# Patient Record
Sex: MALE | Race: NATIVE HAWAIIAN OR OTHER PACIFIC ISLANDER | Employment: FULL TIME | URBAN - METROPOLITAN AREA
[De-identification: names, ages, dates, MRNs, and addresses within clinical notes are randomized per-mention and may not be internally consistent; named-entity substitution may affect disease eponyms.]

---

## 2018-08-15 ENCOUNTER — HOSPITAL ENCOUNTER (OUTPATIENT)
Dept: PHYSICAL THERAPY | Age: 38
Discharge: HOME | End: 2018-08-15
Payer: COMMERCIAL

## 2018-08-15 PROCEDURE — 97162 PT EVAL MOD COMPLEX 30 MIN: CPT

## 2018-08-15 PROCEDURE — 97530 THERAPEUTIC ACTIVITIES: CPT
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
## 2018-08-17 ENCOUNTER — HOSPITAL ENCOUNTER (OUTPATIENT)
Dept: PHYSICAL THERAPY | Age: 38
Discharge: HOME | End: 2018-08-17
Payer: COMMERCIAL

## 2018-08-17 PROCEDURE — 97110 THERAPEUTIC EXERCISES: CPT

## 2018-08-17 NOTE — PROGRESS NOTES
PT DAILY TREATMENT NOTE     Patient Name: Oscar Diamond  Date:2018  : 1980  [x]  Patient  Verified  Payor: David Alexander / Plan: Cindy Ca PPO / Product Type: PPO /    In time:11:30  Out time:12:20  Total Treatment Time (min): 50  Visit #: 2 of 12    Treatment Area: Low back pain [M54.5]    SUBJECTIVE  Pain Level (0-10 scale): 0  Any medication changes, allergies to medications, adverse drug reactions, diagnosis change, or new procedure performed?: [x] No    [] Yes (see summary sheet for update)  Subjective functional status/changes:   [] No changes reported  \"IT feels fine. Just stiff. \"     OBJECTIVE    50 min Therapeutic Exercise:  [x] See flow sheet :   Rationale: increase ROM, increase strength, improve coordination and improve balance to improve the patients ability to perform pain free and safe ADL's and return to work activity           With   [] TE   [] TA   [] neuro   [] other: Patient Education: [x] Review HEP    [] Progressed/Changed HEP based on:   [] positioning   [] body mechanics   [] transfers   [] heat/ice application    [] other:      Other Objective/Functional Measures: Noted thoracis extension      Pain Level (0-10 scale) post treatment: 1    ASSESSMENT/Changes in Function: Pt was able to perform all exercises well with no increased pain or sx's. Noted B rib flare and rib cage protraction when lying supine. Focused on HS and breath with 90/90. Reported slight increase in discomfort in all four belly lift at surgical site. Noted very weak and inhibited serratus. Patient will continue to benefit from skilled PT services to modify and progress therapeutic interventions, address functional mobility deficits, address ROM deficits, address strength deficits, analyze and address soft tissue restrictions, analyze and cue movement patterns, analyze and modify body mechanics/ergonomics and assess and modify postural abnormalities to attain remaining goals.      []  See Plan of Care  [] See progress note/recertification  []  See Discharge Summary         Progress towards goals / Updated goals:  Short Term Goals: STG- To be accomplished in 2 week(s):  1. Pt will be independent with HEP to encourage prophylaxis. Eval:dispensed  Current: Pt reported compliance       Long Term Goals: LTG- To be accomplished in 6 week(s):  1. Pt will increase trunk rotation to less than 15 in bilaterally to improve mobility . Eval:RIght 17.5 in                 Left: 18 in   Current: NA     2.  Pt will be able to bridge >10 times to full height with no pain to indicate functional glut hamstring strength needed for daily activities. Eval:pain with 1 bridge  Current: NA     3.  Pt will be able to bend and squat and lift 3 y.o. Child without pain. Eval:pain and fear  Current: NA     4. Pt FOTO score will increase by 15 points to show improvement in function.   Eval:53  Current: will address at visit 5    PLAN  [x]  Upgrade activities as tolerated     [x]  Continue plan of care  []  Update interventions per flow sheet       []  Discharge due to:_  []  Other:_      Francesca Hazel PTA 8/17/2018  12:30 PM    Future Appointments  Date Time Provider Abdelrahman Ding   8/21/2018 6:00 PM Blessing Dewitt [facility]   8/23/2018 4:30 PM Dillon Ryder PT, DPT [facility]   8/28/2018 6:00 PM VIRGINIA Murphy[facility]JUAN [facility]   8/30/2018 5:00 PM Dillon Ryder PT, DPT [facility]

## 2018-08-21 ENCOUNTER — HOSPITAL ENCOUNTER (OUTPATIENT)
Dept: PHYSICAL THERAPY | Age: 38
Discharge: HOME | End: 2018-08-21
Payer: COMMERCIAL

## 2018-08-21 PROCEDURE — 97110 THERAPEUTIC EXERCISES: CPT

## 2018-08-21 NOTE — PROGRESS NOTES
PT DAILY TREATMENT NOTE     Patient Name: Dutch Rizo  Date:2018  : 1980  [x]  Patient  Verified  Payor: Mahad Alert / Plan: Priscilla Harris PPO / Product Type: PPO /    In time:6:00  Out time:6:50  Total Treatment Time (min): 50  Visit #: 3 of 12    Treatment Area: Low back pain [M54.5]    SUBJECTIVE  Pain Level (0-10 scale): 3-4/10  Any medication changes, allergies to medications, adverse drug reactions, diagnosis change, or new procedure performed?: [x] No    [] Yes (see summary sheet for update)  Subjective functional status/changes:   [] No changes reported  \"I was sitting at work. ITs worse at the end of the day. \"     OBJECTIVE  50 min Therapeutic Exercise:  [x] See flow sheet :   Rationale: increase ROM, increase strength, improve coordination and improve balance to improve the patients ability to perofrm pain free ADL's     With   [] TE   [] TA   [] neuro   [] other: Patient Education: [x] Review HEP    [] Progressed/Changed HEP based on:   [] positioning   [] body mechanics   [] transfers   [] heat/ice application    [] other:      Other Objective/Functional Measures:   TTP at Lumbar paraspinals right > left      Pain Level (0-10 scale) post treatment: 0    ASSESSMENT/Changes in Function: Pt reported soreness after sitting at work all day. Good response to SB stretching. Noted very tone Lumbar paraspinals. Continues to need to address post chain inhibition , and increase core strength. Plan to add cross connect for obliques NV. Patient will continue to benefit from skilled PT services to modify and progress therapeutic interventions, address functional mobility deficits, address ROM deficits, address strength deficits, analyze and address soft tissue restrictions, analyze and cue movement patterns, analyze and modify body mechanics/ergonomics, assess and modify postural abnormalities, address imbalance/dizziness and instruct in home and community integration to attain remaining goals. []  See Plan of Care  []  See progress note/recertification  []  See Discharge Summary         Progress towards goals / Updated goals:  Short Term Goals: STG- To be accomplished in 2 week(s):  1.  Pt will be independent with HEP to encourage prophylaxis. Eval:dispensed  Current: Pt reported compliance       Long Term Goals: LTG- To be accomplished in 6 week(s):  1.  Pt will increase trunk rotation to less than 15 in bilaterally to improve mobility . Eval:RIght 17.5 in                 Left: 18 in   Current: NA      2.  Pt will be able to bridge >10 times to full height with no pain to indicate functional glut hamstring strength needed for daily activities. Eval:pain with 1 bridge  Current: NA      3.  Pt will be able to bend and squat and lift 3 y.o. Child without pain. Eval:pain and fear  Current: NA      4.  Pt FOTO score will increase by 15 points to show improvement in function.   Eval:53  Current: will address at visit 5       PLAN  [x]  Upgrade activities as tolerated     [x]  Continue plan of care  []  Update interventions per flow sheet       []  Discharge due to:_  []  Other:_      Jordana Funez PTA 2018  6:34 PM    Future Appointments  Date Time Provider Abdelrahman Ding   2018 4:30 PM Roma Castillo PT, DPT [facility]   2018 6:00 PM VIRGINIA Shaikh [facility]   2018 5:00 PM Roma Castillo PT, DPT ROXANN [facility]

## 2018-08-23 ENCOUNTER — HOSPITAL ENCOUNTER (OUTPATIENT)
Dept: PHYSICAL THERAPY | Age: 38
Discharge: HOME | End: 2018-08-23
Payer: COMMERCIAL

## 2018-08-23 PROCEDURE — 97530 THERAPEUTIC ACTIVITIES: CPT

## 2018-08-23 PROCEDURE — 97110 THERAPEUTIC EXERCISES: CPT

## 2018-08-23 NOTE — PROGRESS NOTES
PT DAILY TREATMENT NOTE 12    Patient Name: Jannette Avila  Date:2018  : 1980  [x]  Patient  Verified  Payor: Emily Crouch / Plan: Avinash Tucker PPO / Product Type: PPO /    In time:4:35 pm  Out time:5:35 pm   Total Treatment Time (min): 60  Visit #: 4 of 12    Treatment Area: Low back pain [M54.5]    SUBJECTIVE  Pain Level (0-10 scale): 4  Any medication changes, allergies to medications, adverse drug reactions, diagnosis change, or new procedure performed?: [x] No    [] Yes (see summary sheet for update)  Subjective functional status/changes:   [] No changes reported  \"Just a little sore from sitting. \"    OBJECTIVE    Modality rationale:    Min Type Additional Details    [] Estim:  []Unatt       []IFC  []Premod                        []Other:  []w/ice   []w/heat  Position:  Location:    [] Estim: []Att    []TENS instruct  []NMES                    []Other:  []w/US   []w/ice   []w/heat  Position:  Location:    []  Traction: [] Cervical       []Lumbar                       [] Prone          []Supine                       []Intermittent   []Continuous Lbs:  [] before manual  [] after manual    []  Ultrasound: []Continuous   [] Pulsed                           []1MHz   []3MHz W/cm2:  Location:    []  Iontophoresis with dexamethasone         Location: [] Take home patch   [] In clinic    []  Ice     []  heat  []  Ice massage  []  Laser   []  Anodyne Position:  Location:    []  Laser with stim  []  Other:  Position:  Location:    []  Vasopneumatic Device Pressure:       [] lo [] med [] hi   Temperature: [] lo [] med [] hi   [] Skin assessment post-treatment:  []intact []redness- no adverse reaction    []redness  adverse reaction:     50 min Therapeutic Exercise:  [x] See flow sheet :   Rationale: increase ROM, increase strength, improve coordination and increase proprioception to improve the patients ability to perform daily activities with decreased pain and symptom levels    10 min Therapeutic Activity: [x]  See flow sheet :   Rationale: increase ROM, increase strength, improve coordination and increase proprioception  to improve the patients ability to perform daily activities with decreased pain and symptom levels           With   [] TE   [x] TA   [] neuro   [] other: Patient Education: [x] Review HEP    [] Progressed/Changed HEP based on:   [] positioning   [] body mechanics   [] transfers   [] heat/ice application    [] other:      Other Objective/Functional Measures:   Trunk rot 17 in bilaterally     Pain Level (0-10 scale) post treatment: 0-1    ASSESSMENT/Changes in Function:   Pt is tolerating treatment well. Most pain with prolonged sitting. Initiated crossovers this session. Patient will continue to benefit from skilled PT services to modify and progress therapeutic interventions, address functional mobility deficits, address ROM deficits, address strength deficits, analyze and address soft tissue restrictions, analyze and cue movement patterns, analyze and modify body mechanics/ergonomics, assess and modify postural abnormalities and instruct in home and community integration to attain remaining goals. []  See Plan of Care  []  See progress note/recertification  []  See Discharge Summary         Progress towards goals / Updated goals:  Short Term Goals: STG- To be accomplished in 2 week(s):  1.  Pt will be independent with HEP to encourage prophylaxis. Eval:dispensed  Current: Pt reported compliance       Long Term Goals: LTG- To be accomplished in 6 week(s):  1.  Pt will increase trunk rotation to less than 15 in bilaterally to improve mobility . Eval:RIght 17.5 in                 Left: 18 in   Current: progressing 17 in       2.  Pt will be able to bridge >10 times to full height with no pain to indicate functional glut hamstring strength needed for daily activities. Eval:pain with 1 bridge  Current: NA      3.  Pt will be able to bend and squat and lift 3 y.o.  Child without pain.  Eval:pain and fear  Current: NA      4.  Pt FOTO score will increase by 15 points to show improvement in function.   Eval:53  Current: will address at visit 5    PLAN  [x]  Upgrade activities as tolerated     []  Continue plan of care  []  Update interventions per flow sheet       []  Discharge due to:_  []  Other:_      Jose Guadalupe Ayala PT, DPT 8/23/2018  4:38 PM    Future Appointments  Date Time Provider Abdlerahman Ding   8/28/2018 6:00 PM Carmina Delgadillo PTA [facility]   8/30/2018 5:00 PM Jose Guadalupe Ayala PT, DPT [facility]   9/19/2018 3:30 PM Alyse Lubin MD [id number] [facility]

## 2018-08-28 ENCOUNTER — HOSPITAL ENCOUNTER (OUTPATIENT)
Dept: PHYSICAL THERAPY | Age: 38
Discharge: HOME | End: 2018-08-28
Payer: COMMERCIAL

## 2018-08-28 PROCEDURE — 97110 THERAPEUTIC EXERCISES: CPT

## 2018-08-28 PROCEDURE — 97530 THERAPEUTIC ACTIVITIES: CPT

## 2018-08-28 NOTE — PROGRESS NOTES
PT DAILY TREATMENT NOTE  Patient Name: Jag Flynn 
Date:2018 : 1980 [x]  Patient  Verified Payor: Radha Moralez / Plan: Laura Blanco PPO / Product Type: PPO / In time:5:55 pm  Out time:6:55 pm  
Total Treatment Time (min): 60 Visit #: 5 of 12 Treatment Area: Low back pain [M54.5] SUBJECTIVE Pain Level (0-10 scale): 3 Any medication changes, allergies to medications, adverse drug reactions, diagnosis change, or new procedure performed?: [x] No    [] Yes (see summary sheet for update) Subjective functional status/changes:   [] No changes reported \"I am noticing a little pain. \" OBJECTIVE Modality rationale:   
Min Type Additional Details  
 [] Estim:  []Unatt       []IFC  []Premod []Other:  []w/ice   []w/heat Position: Location:  
 [] Estim: []Att    []TENS instruct  []NMES []Other:  []w/US   []w/ice   []w/heat Position: Location:  
 []  Traction: [] Cervical       []Lumbar 
                     [] Prone          []Supine []Intermittent   []Continuous Lbs: 
[] before manual 
[] after manual  
 []  Ultrasound: []Continuous   [] Pulsed []1MHz   []3MHz W/cm2: 
Location:  
 []  Iontophoresis with dexamethasone Location: [] Take home patch  
[] In clinic  
 []  Ice     []  heat 
[]  Ice massage 
[]  Laser  
[]  Anodyne Position: Location:  
 []  Laser with stim 
[]  Other:  Position: Location:  
 []  Vasopneumatic Device Pressure:       [] lo [] med [] hi  
Temperature: [] lo [] med [] hi  
[] Skin assessment post-treatment:  []intact []redness- no adverse reaction 
  []redness  adverse reaction:  
 
50 min Therapeutic Exercise:  [x] See flow sheet :  
Rationale: increase ROM, increase strength, improve coordination and increase proprioception to improve the patients ability to perform daily activities with decreased pain and symptom levels 10 min Therapeutic Activity:  [x]  See flow sheet :  
Rationale: increase ROM, increase strength, improve coordination and increase proprioception  to improve the patients ability to perform daily activities with decreased pain and symptom levels With 
 [] TE 
 [] TA 
 [] neuro 
 [] other: Patient Education: [x] Review HEP [] Progressed/Changed HEP based on:  
[] positioning   [] body mechanics   [] transfers   [] heat/ice application   
[] other:   
 
Other Objective/Functional Measures: FOTO: 63  
 
Pain Level (0-10 scale) post treatment: 0-1 ASSESSMENT/Changes in Function:  
Pt responded well to trunk rotation. Increased use of accessory muscles for respiration. Patient will continue to benefit from skilled PT services to modify and progress therapeutic interventions, address functional mobility deficits, address ROM deficits, address strength deficits, analyze and address soft tissue restrictions, analyze and cue movement patterns, analyze and modify body mechanics/ergonomics, assess and modify postural abnormalities and instruct in home and community integration to attain remaining goals. []  See Plan of Care 
[]  See progress note/recertification 
[]  See Discharge Summary Progress towards goals / Updated goals: 
Short Term Goals: STG- To be accomplished in 2 week(s): 1.  Pt will be independent with HEP to encourage prophylaxis. Eval:dispensed Current: Pt reported compliance  
   
Long Term Goals: LTG- To be accomplished in 6 week(s): 1.  Pt will increase trunk rotation to less than 15 in bilaterally to improve mobility . Eval:RIght 17.5 in                 Left: 18 in  
Current: progressing 17 in  
   
2.  Pt will be able to bridge >10 times to full height with no pain to indicate functional glut hamstring strength needed for daily activities. Eval:pain with 1 bridge Current: NA 
   
3.  Pt will be able to bend and squat and lift 5 y.o. Child without pain. Eval:pain and fear Current: NA 
   
4.  Pt FOTO score will increase by 15 points to show improvement in function. Eval:53 Current: progressing 63 PLAN [x]  Upgrade activities as tolerated     []  Continue plan of care 
[]  Update interventions per flow sheet      
[]  Discharge due to:_ 
[]  Other:_ David Bauer PT, DPT 8/28/2018  6:06 PM 
 
Future Appointments Date Time Provider Abdelrahman Toañ 8/30/2018 5:00 PM David Bauer PT, DPT [facility]  
9/4/2018 6:00 PM Marbella Meals, PTA [facility]  
9/7/2018 8:30 AM Marbella Meals, PTA [facility]  
9/11/2018 5:30 PM David Bauer PT, DPT [facility]  
9/13/2018 6:00 PM Marbella Meals, PTA [facility]  
9/18/2018 5:30 PM Marbella Meals, PTA [facility]  
9/19/2018 3:30 PM Jazmyne Singleton MD [facility]  
9/20/2018 5:30 PM Marbella Meals, PTA [facility]  
9/25/2018 6:00 PM David Bauer PT, DPT [facility]  
9/27/2018 6:00 PM Marbella Meals, PTA [facility]

## 2018-08-30 ENCOUNTER — HOSPITAL ENCOUNTER (OUTPATIENT)
Dept: PHYSICAL THERAPY | Age: 38
Discharge: HOME | End: 2018-08-30
Payer: COMMERCIAL

## 2018-08-30 PROCEDURE — 97110 THERAPEUTIC EXERCISES: CPT

## 2018-08-30 PROCEDURE — 97530 THERAPEUTIC ACTIVITIES: CPT

## 2018-08-30 NOTE — PROGRESS NOTES
PT DAILY TREATMENT NOTE 12 Patient Name: Jenni Arana 
Date:2018 : 1980 [x]  Patient  Verified Payor: Mary Pruitt / Plan: Rodriguez Spray PPO / Product Type: PPO / In time: 4:28 pm  Out time:5:23 pm  
Total Treatment Time (min): 55 Visit #: 6 of 12 Treatment Area: Low back pain [M54.5] SUBJECTIVE Pain Level (0-10 scale): 0 Any medication changes, allergies to medications, adverse drug reactions, diagnosis change, or new procedure performed?: [x] No    [] Yes (see summary sheet for update) Subjective functional status/changes:   [] No changes reported \"Pretty good. \" OBJECTIVE Modality rationale:   
Min Type Additional Details  
 [] Estim:  []Unatt       []IFC  []Premod []Other:  []w/ice   []w/heat Position: Location:  
 [] Estim: []Att    []TENS instruct  []NMES []Other:  []w/US   []w/ice   []w/heat Position: Location:  
 []  Traction: [] Cervical       []Lumbar 
                     [] Prone          []Supine []Intermittent   []Continuous Lbs: 
[] before manual 
[] after manual  
 []  Ultrasound: []Continuous   [] Pulsed []1MHz   []3MHz W/cm2: 
Location:  
 []  Iontophoresis with dexamethasone Location: [] Take home patch  
[] In clinic  
 []  Ice     []  heat 
[]  Ice massage 
[]  Laser  
[]  Anodyne Position: Location:  
 []  Laser with stim 
[]  Other:  Position: Location:  
 []  Vasopneumatic Device Pressure:       [] lo [] med [] hi  
Temperature: [] lo [] med [] hi  
[] Skin assessment post-treatment:  []intact []redness- no adverse reaction 
  []redness  adverse reaction:  
 
45 min Therapeutic Exercise:  [x] See flow sheet :  
Rationale: increase ROM, increase strength, improve coordination and increase proprioception to improve the patients ability to perform daily activities with decreased pain and symptom levels 
  
 10 min Therapeutic Activity:  [x]  See flow sheet :  
Rationale: increase ROM, increase strength, improve coordination and increase proprioception  to improve the patients ability to perform daily activities with decreased pain and symptom levels With 
 [] TE 
 [] TA 
 [] neuro 
 [] other: Patient Education: [x] Review HEP [] Progressed/Changed HEP based on:  
[] positioning   [] body mechanics   [] transfers   [] heat/ice application   
[] other:   
 
Other Objective/Functional Measures:  
Poor squat form Decreased height with bridges Pain Level (0-10 scale) post treatment: 0 
 
ASSESSMENT/Changes in Function:  
Pt is demonstrating gradual progress with treatment. No pain this session and was able to initiate bridging. significant abdominal weakness and fatigue. Patient will continue to benefit from skilled PT services to modify and progress therapeutic interventions, address functional mobility deficits, address ROM deficits, address strength deficits, analyze and address soft tissue restrictions, analyze and cue movement patterns, analyze and modify body mechanics/ergonomics, assess and modify postural abnormalities and instruct in home and community integration to attain remaining goals. []  See Plan of Care 
[]  See progress note/recertification 
[]  See Discharge Summary Progress towards goals / Updated goals: 
Short Term Goals: STG- To be accomplished in 2 week(s): 1.  Pt will be independent with HEP to encourage prophylaxis. Eval:dispensed Current: Pt reported compliance  
   
Long Term Goals: LTG- To be accomplished in 6 week(s): 1.  Pt will increase trunk rotation to less than 15 in bilaterally to improve mobility . Eval:RIght 17.5 in                 Left: 18 in  
Current: progressing 17 in  
   
2.  Pt will be able to bridge >10 times to full height with no pain to indicate functional glut hamstring strength needed for daily activities. Eval:pain with 1 bridge Current: tolerated 10 bridges with decreased height, no pain - PROGRESSING 
   
3.  Pt will be able to bend and squat and lift 5 y.o. Child without pain. Eval:pain and fear Current: NA 
   
4.  Pt FOTO score will increase by 15 points to show improvement in function. Eval:53 Current: progressing 63 PLAN [x]  Upgrade activities as tolerated     []  Continue plan of care 
[]  Update interventions per flow sheet      
[]  Discharge due to:_ 
[]  Other:_ Thao Warren PT, DPT 8/30/2018  4:26 PM 
 
Future Appointments Date Time Provider Abdelrahman Ding 8/30/2018 4:30 PM Thao Warren PT, DPT [facility]  
9/4/2018 6:00 PM Leo Anderson PTA [facility]  
9/7/2018 8:30 AM Leo Anderson PTA [facility]  
9/11/2018 5:30 PM Thao Warren PT, DPT [facility]  
9/13/2018 6:00 PM Leo Peaches, PTA [facility]  
9/18/2018 5:30 PM Leo Anderson, PTA [facility]  
9/19/2018 3:30 PM Osiel Greer MD [address]  
9/20/2018 5:30 PM Leo Anderson, PTA [facility]  
9/25/2018 6:00 PM Thao Warren PT, DPT [facility]  
9/27/2018 6:00 PM Leo Anderson PTA [facility]

## 2018-09-04 ENCOUNTER — APPOINTMENT (OUTPATIENT)
Dept: PHYSICAL THERAPY | Age: 38
End: 2018-09-04
Payer: COMMERCIAL

## 2018-09-05 ENCOUNTER — APPOINTMENT (OUTPATIENT)
Dept: PHYSICAL THERAPY | Age: 38
End: 2018-09-05
Payer: COMMERCIAL

## 2018-09-05 ENCOUNTER — HOSPITAL ENCOUNTER (OUTPATIENT)
Dept: PHYSICAL THERAPY | Age: 38
Discharge: HOME | End: 2018-09-05
Payer: COMMERCIAL

## 2018-09-05 PROCEDURE — 97110 THERAPEUTIC EXERCISES: CPT

## 2018-09-05 PROCEDURE — 97530 THERAPEUTIC ACTIVITIES: CPT

## 2018-09-05 NOTE — PROGRESS NOTES
PT DAILY TREATMENT NOTE  Patient Name: Maira Cruz 
Date:2018 : 1980 [x]  Patient  Verified Payor: Marquita Bustamante / Plan: Arlander Boeck PPO / Product Type: PPO / In time:8:30  Out time:9:35 Total Treatment Time (min): 65 Visit #: 7 of 12 Treatment Area: Low back pain [M54.5] SUBJECTIVE Pain Level (0-10 scale): 0 Any medication changes, allergies to medications, adverse drug reactions, diagnosis change, or new procedure performed?: [x] No    [] Yes (see summary sheet for update) Subjective functional status/changes:   [] No changes reported \"Its getting better\" OBJECTIVE 30 min Therapeutic Exercise:  [x] See flow sheet :  
Rationale: increase ROM, increase strength, improve coordination and improve balance to improve the patients ability to perform pain free ADL's  
 
35 min Therapeutic Activity:  []  See flow sheet :  
Rationale: increase ROM, increase strength, improve coordination and improve balance  to improve the patients ability to perform pain free ADl's With 
 [] TE 
 [] TA 
 [] neuro 
 [] other: Patient Education: [x] Review HEP [] Progressed/Changed HEP based on:  
[] positioning   [] body mechanics   [] transfers   [] heat/ice application   
[] other:   
 
Other Objective/Functional Measures:  
Improved thoracic flexion Pain Level (0-10 scale) post treatment: 0 
 
ASSESSMENT/Changes in Function: Pt continues to demonstrate improved tolerance to activity and thoracic flexion. Added supine thoracic flexion with bar to mid shin, noted muscle shaking and difficulty. Continued to note weakness and fatigue in abs.   
 
Patient will continue to benefit from skilled PT services to modify and progress therapeutic interventions, address functional mobility deficits, address ROM deficits, address strength deficits, analyze and address soft tissue restrictions, analyze and cue movement patterns, analyze and modify body mechanics/ergonomics, assess and modify postural abnormalities, address imbalance/dizziness and instruct in home and community integration to attain remaining goals. []  See Plan of Care 
[]  See progress note/recertification 
[]  See Discharge Summary Progress towards goals / Updated goals: 
Short Term Goals: STG- To be accomplished in 2 week(s): 1.  Pt will be independent with HEP to encourage prophylaxis. Eval:dispensed Current: Pt reported compliance  
   
Long Term Goals: LTG- To be accomplished in 6 week(s): 1.  Pt will increase trunk rotation to less than 15 in bilaterally to improve mobility . Eval:RIght 17.5 in                 Left: 18 in  
Current: progressing 17 in  
   
2.  Pt will be able to bridge >10 times to full height with no pain to indicate functional glut hamstring strength needed for daily activities. Eval:pain with 1 bridge Current: tolerated 10 bridges with decreased height, no pain - PROGRESSING 
   
3.  Pt will be able to bend and squat and lift 5 y.o. Child without pain. Eval:pain and fear Current: NA 
   
4.  Pt FOTO score will increase by 15 points to show improvement in function. Eval:53 Current: progressing 63 
  
 
PLAN [x]  Upgrade activities as tolerated     [x]  Continue plan of care 
[]  Update interventions per flow sheet      
[]  Discharge due to:_ 
[]  Other:_ Michi Long PTA 9/5/2018  10:14 AM 
 
Future Appointments Date Time Provider Abdelrahman Ding 9/7/2018 8:30 AM VIRGINIA Samuel [facility]  
9/10/2018 5:00 PM VIRGINIA SamuelHPJUAN [facility]  
9/13/2018 6:00 PM VIRGINIA SamuelHPJUAN [facility]  
9/18/2018 5:30 PM Michi Long PTA [facility]  
9/19/2018 3:30 PM [facility]  
9/20/2018 5:30 PM V[place] SamuelHPJUAN [facility]  
9/25/2018 6:00 PM Lorie Luo PT, DPT [facility]  
9/27/2018 6:00 PM VIRGINIA SamuelHPJUAN [facility]

## 2018-09-06 ENCOUNTER — HOSPITAL ENCOUNTER (OUTPATIENT)
Dept: PHYSICAL THERAPY | Age: 38
Discharge: HOME | End: 2018-09-06
Payer: COMMERCIAL

## 2018-09-06 PROCEDURE — 97530 THERAPEUTIC ACTIVITIES: CPT

## 2018-09-06 PROCEDURE — 97110 THERAPEUTIC EXERCISES: CPT
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
## 2018-09-07 ENCOUNTER — APPOINTMENT (OUTPATIENT)
Dept: PHYSICAL THERAPY | Age: 38
End: 2018-09-07
Payer: COMMERCIAL

## 2018-09-10 ENCOUNTER — HOSPITAL ENCOUNTER (OUTPATIENT)
Dept: PHYSICAL THERAPY | Age: 38
Discharge: HOME | End: 2018-09-10
Payer: COMMERCIAL

## 2018-09-10 PROCEDURE — 97110 THERAPEUTIC EXERCISES: CPT

## 2018-09-10 NOTE — PROGRESS NOTES
PT DAILY TREATMENT NOTE  Patient Name: Edil Corey 
Date:9/10/2018 : 1980 [x]  Patient  Verified Payor: Taz Rice / Plan: Frank Ramon PPO / Product Type: PPO / In time:5:00  Out time:5:45 Total Treatment Time (min): 45 Visit #: 9 of 12 Treatment Area: Low back pain [M54.5] SUBJECTIVE Pain Level (0-10 scale): 0 Any medication changes, allergies to medications, adverse drug reactions, diagnosis change, or new procedure performed?: [x] No    [] Yes (see summary sheet for update) Subjective functional status/changes:   [] No changes reported 'I drove my daughter to school and it was a 7 hour drive, so its sore. \" OBJECTIVE 45 min Therapeutic Exercise:  [x] See flow sheet :  
Rationale: increase ROM, increase strength and improve coordination to improve the patients ability to perform pain free ADL's With 
 [] TE 
 [] TA 
 [] neuro 
 [] other: Patient Education: [x] Review HEP [] Progressed/Changed HEP based on:  
[] positioning   [] body mechanics   [] transfers   [] heat/ice application   
[] other:   
 
Other Objective/Functional Measures: LTR Left 17 right 19 Pain Level (0-10 scale) post treatment: 0 
 
ASSESSMENT/Changes in Function: Pt reported pain increased when sitting and driving for 7 hours. Noted good thoracic flexion with supine bar reach, and notable fasciculations of obliques. Patient will continue to benefit from skilled PT services to modify and progress therapeutic interventions, address functional mobility deficits, address ROM deficits, address strength deficits, analyze and address soft tissue restrictions, analyze and cue movement patterns, analyze and modify body mechanics/ergonomics, assess and modify postural abnormalities, address imbalance/dizziness and instruct in home and community integration to attain remaining goals. []  See Plan of Care 
[]  See progress note/recertification 
[]  See Discharge Summary Progress towards goals / Updated goals: 
Short Term Goals: STG- To be accomplished in 2 week(s): 1.  Pt will be independent with HEP to encourage prophylaxis. Eval:dispensed Current: Pt reported compliance - goal MET 
   
Long Term Goals: LTG- To be accomplished in 6 week(s): 1.  Pt will increase trunk rotation to less than 15 in bilaterally to improve mobility . Eval:RIght 17.5 in                 Left: 18 in  
Current: 17 in - progressing  
   
2.  Pt will be able to bridge >10 times to full height with no pain to indicate functional glut hamstring strength needed for daily activities. Eval:pain with 1 bridge Current: tolerated 20 bridges with decreased height, no pain - PROGRESSING 
   
3.  Pt will be able to bend and squat and lift 5 y.o. Child without pain. Eval:pain and fear Current: improving, able to  son however achy still with max pain 2/10 - progressing  
   
4.  Pt FOTO score will increase by 15 points to show improvement in function. Eval:53 Current: 61 - progressing  
   
  
 
PLAN [x]  Upgrade activities as tolerated     [x]  Continue plan of care 
[]  Update interventions per flow sheet      
[]  Discharge due to:_ 
[]  Other:_ Zuly Garcia PTA 9/10/2018  5:48 PM 
 
Future Appointments Date Time Provider Abdelrahman Ding 9/13/2018 6:00 PM [facility] [facility]  
9/18/2018 5:30 PM V[place] Oreilly[facility]JU[facility]  
9/19/2018 3:30 PM Jose L Duggan MD [id number] [facility]  
9/20/2018 5:30 PM [facility]  
9/25/2018 6:00 PM Dylan Caballero PT, DPT [facility]  
9/27/2018 6:00 PM V[place] Oreilly[facility]JU[facility]

## 2018-09-11 ENCOUNTER — APPOINTMENT (OUTPATIENT)
Dept: PHYSICAL THERAPY | Age: 38
End: 2018-09-11
Payer: COMMERCIAL

## 2018-09-13 ENCOUNTER — APPOINTMENT (OUTPATIENT)
Dept: PHYSICAL THERAPY | Age: 38
End: 2018-09-13
Payer: COMMERCIAL

## 2018-09-18 ENCOUNTER — HOSPITAL ENCOUNTER (OUTPATIENT)
Dept: PHYSICAL THERAPY | Age: 38
Discharge: HOME | End: 2018-09-18
Payer: COMMERCIAL

## 2018-09-18 PROBLEM — R31.29 MICROSCOPIC HEMATURIA: Status: ACTIVE | Noted: 2018-09-18

## 2018-09-18 PROBLEM — R97.20 ELEVATED PSA: Status: ACTIVE | Noted: 2018-09-18

## 2018-09-18 PROCEDURE — 97110 THERAPEUTIC EXERCISES: CPT

## 2018-09-18 NOTE — PROGRESS NOTES
PT DAILY TREATMENT NOTE  Patient Name: Charly Field 
Date:2018 : 1980 [x]  Patient  Verified Payor: Ok Leary / Plan: Mode Sheriff PPO / Product Type: PPO / In time:5:30  Out time:6:20 Total Treatment Time (min): 50 Visit #: 10 of 12 Treatment Area: Low back pain [M54.5] SUBJECTIVE Pain Level (0-10 scale): 0 Any medication changes, allergies to medications, adverse drug reactions, diagnosis change, or new procedure performed?: [x] No    [] Yes (see summary sheet for update) Subjective functional status/changes:   [] No changes reported \"no pain. \" OBJECTIVE Modality rationale: decrease edema, decrease inflammation and decrease pain to improve the patients ability to perform pain free ADL's   
Min Type Additional Details  
 [] Estim:  []Unatt       []IFC  []Premod []Other:  []w/ice   []w/heat Position: Location:  
 [] Estim: []Att    []TENS instruct  []NMES []Other:  []w/US   []w/ice   []w/heat Position: Location:  
 []  Traction: [] Cervical       []Lumbar 
                     [] Prone          []Supine []Intermittent   []Continuous Lbs: 
[] before manual 
[] after manual  
 []  Ultrasound: []Continuous   [] Pulsed []1MHz   []3MHz W/cm2: 
Location:  
 []  Iontophoresis with dexamethasone Location: [] Take home patch  
[] In clinic  
 []  Ice     []  heat 
[]  Ice massage 
[]  Laser  
[]  Anodyne Position: Location:  
 []  Laser with stim 
[]  Other:  Position: Location:  
 []  Vasopneumatic Device Pressure:       [] lo [] med [] hi  
Temperature: [] lo [] med [] hi  
[] Skin assessment post-treatment:  []intact []redness- no adverse reaction 50 min Therapeutic Exercise:  [] See flow sheet :  
Rationale: increase ROM, increase strength and improve coordination to improve the patients ability to perform With 
 [] TE 
 [] TA 
 [] neuro [] other: Patient Education: [x] Review HEP [] Progressed/Changed HEP based on:  
[] positioning   [] body mechanics   [] transfers   [] heat/ice application   
[] other:   
 
Other Objective/Functional Measures:  
Supine bar reach to ankles 1-2 inch lift with Single leg bridge Pain Level (0-10 scale) post treatment: 0 
 
ASSESSMENT/Changes in Function: Pt reported no pain. Noted very challenged with supine bar reach. Improved body awareness however continues to have difficulty with post weight shift to heels and hip hinge. Added kneeling lat stretch on swiss ball with obliques. Pt reported feeling \" stretched\" after session, however denied pain. Patient will continue to benefit from skilled PT services to modify and progress therapeutic interventions, address functional mobility deficits, address ROM deficits, address strength deficits, analyze and address soft tissue restrictions, analyze and cue movement patterns, analyze and modify body mechanics/ergonomics, assess and modify postural abnormalities, address imbalance/dizziness and instruct in home and community integration to attain remaining goals. []  See Plan of Care 
[]  See progress note/recertification 
[]  See Discharge Summary Progress towards goals / Updated goals: 
Short Term Goals: STG- To be accomplished in 2 week(s): 1.  Pt will be independent with HEP to encourage prophylaxis. Eval:dispensed Current: Pt reported compliance - goal MET 
   
Long Term Goals: LTG- To be accomplished in 6 week(s): 1.  Pt will increase trunk rotation to less than 15 in bilaterally to improve mobility . Eval:RIght 17.5 in                 Left: 18 in  
Current: 17 in - progressing  
   
2.  Pt will be able to bridge >10 times to full height with no pain to indicate functional glut hamstring strength needed for daily activities. Eval:pain with 1 bridge Current: tolerated 20 bridges with decreased height, no pain - PROGRESSING 
   
 3.  Pt will be able to bend and squat and lift 5 y.o. Child without pain. Eval:pain and fear Current: improving, able to  son however achy still with max pain 2/10 - progressing  
   
4.  Pt FOTO score will increase by 15 points to show improvement in function. Eval:53 Current: 63 - progressing PLAN [x]  Upgrade activities as tolerated     []  Continue plan of care 
[]  Update interventions per flow sheet      
[]  Discharge due to:_ 
[]  Other:_ Jamey Dance, PTA 9/18/2018  5:29 PM 
 
Future Appointments Date Time Provider Abdelrahman Ding 9/18/2018 5:30 PM Jamey Dance, PTA [facility]  
9/19/2018 3:30 PM Sherie Villar MD [place] [id number]  
9/20/2018 5:30 PM Jamey Dance, PTA [facility]  
9/25/2018 6:00 PM Rock Martinez PT, DPT [facility]  
9/27/2018 6:00 PM Jamey Dance, PTA [facility]

## 2018-09-20 ENCOUNTER — APPOINTMENT (OUTPATIENT)
Dept: PHYSICAL THERAPY | Age: 38
End: 2018-09-20
Payer: COMMERCIAL

## 2018-09-25 ENCOUNTER — HOSPITAL ENCOUNTER (OUTPATIENT)
Dept: PHYSICAL THERAPY | Age: 38
Discharge: HOME | End: 2018-09-25
Payer: COMMERCIAL

## 2018-09-25 PROCEDURE — 97110 THERAPEUTIC EXERCISES: CPT

## 2018-09-25 PROCEDURE — 97530 THERAPEUTIC ACTIVITIES: CPT

## 2018-09-25 NOTE — PROGRESS NOTES
PT DAILY TREATMENT NOTE  Patient Name: Иван Dewitt 
Date:2018 : 1980 [x]  Patient  Verified Payor: Esther Bailon / Plan: Naya Villatoro PPO / Product Type: PPO / In time:5:55 pm  Out time:6:48 pm  
Total Treatment Time (min): 53 Visit #: 89 UL 04 Treatment Area: Low back pain [M54.5] SUBJECTIVE Pain Level (0-10 scale): 0 Any medication changes, allergies to medications, adverse drug reactions, diagnosis change, or new procedure performed?: [x] No    [] Yes (see summary sheet for update) Subjective functional status/changes:   [] No changes reported Reports stiffness with flexion and prolonged driving OBJECTIVE Modality rationale:   
Min Type Additional Details  
 [] Estim:  []Unatt       []IFC  []Premod []Other:  []w/ice   []w/heat Position: Location:  
 [] Estim: []Att    []TENS instruct  []NMES []Other:  []w/US   []w/ice   []w/heat Position: Location:  
 []  Traction: [] Cervical       []Lumbar 
                     [] Prone          []Supine []Intermittent   []Continuous Lbs: 
[] before manual 
[] after manual  
 []  Ultrasound: []Continuous   [] Pulsed []1MHz   []3MHz W/cm2: 
Location:  
 []  Iontophoresis with dexamethasone Location: [] Take home patch  
[] In clinic  
 []  Ice     []  heat 
[]  Ice massage 
[]  Laser  
[]  Anodyne Position: Location:  
 []  Laser with stim 
[]  Other:  Position: Location:  
 []  Vasopneumatic Device Pressure:       [] lo [] med [] hi  
Temperature: [] lo [] med [] hi  
[] Skin assessment post-treatment:  []intact []redness- no adverse reaction 
  []redness  adverse reaction:  
 
40 min Therapeutic Exercise:  [x] See flow sheet :  
Rationale: increase ROM, increase strength, improve coordination and increase proprioception to improve the patients ability to perform daily activities with decreased pain and symptom levels    
 13 min Therapeutic Activity:  [x]  See flow sheet :  
Rationale: increase ROM, increase strength, improve coordination and increase proprioception  to improve the patients ability to perform daily activities with decreased pain and symptom levels With 
 [] TE 
 [] TA 
 [] neuro 
 [] other: Patient Education: [x] Review HEP [] Progressed/Changed HEP based on:  
[] positioning   [] body mechanics   [] transfers   [] heat/ice application   
[] other:   
 
Other Objective/Functional Measures: FOTO: 83 Supine bar reach to toes Pain Level (0-10 scale) post treatment: 0 
 
ASSESSMENT/Changes in Function:  
Increased fatigue with kneeing SB activities. Plan to add planks next session and discuss gym appropriate activities. Pt would like to return to running. Plan to see 1x per week as returns to gym. Patient will continue to benefit from skilled PT services to modify and progress therapeutic interventions, address functional mobility deficits, address ROM deficits, address strength deficits, analyze and address soft tissue restrictions, analyze and cue movement patterns, analyze and modify body mechanics/ergonomics, assess and modify postural abnormalities and instruct in home and community integration to attain remaining goals. []  See Plan of Care 
[]  See progress note/recertification 
[]  See Discharge Summary Progress towards goals / Updated goals: 
Short Term Goals: STG- To be accomplished in 2 week(s): 1.  Pt will be independent with HEP to encourage prophylaxis. Eval:dispensed Current: Pt reported compliance - goal MET 
   
Long Term Goals: LTG- To be accomplished in 6 week(s): 1.  Pt will increase trunk rotation to less than 15 in bilaterally to improve mobility . Eval:RIght 17.5 in                 Left: 18 in Last PN: 17 in - progressing Current:  
   
2.  Pt will be able to bridge >10 times to full height with no pain to indicate functional glut hamstring strength needed for daily activities. Eval:pain with 1 bridge Last PN: tolerated 20 bridges with decreased height, no pain - PROGRESSING Current   
3.  Pt will be able to bend and squat and lift 5 y.o. Child without pain. Eval:pain and fear Last PN: improving, able to  son however achy still with max pain 2/10 - progressing Current: progressing with KB squats 
   
4.  Pt FOTO score will increase by 15 points to show improvement in function. Eval:53 Last PN: 61 - progressing Current:MET 83 PLAN 
[]  Upgrade activities as tolerated     []  Continue plan of care 
[]  Update interventions per flow sheet      
[]  Discharge due to:_ 
[]  Other:_ Omer Meyer, PT, DPT 9/25/2018  6:50 PM 
 
Future Appointments Date Time Provider Abdelrahman Ding 9/27/2018 6:00 PM Kolby Barcenas PTA [facility]  
10/17/2018 3:00 PM MD Tarik Wheeler

## 2018-09-27 ENCOUNTER — APPOINTMENT (OUTPATIENT)
Dept: PHYSICAL THERAPY | Age: 38
End: 2018-09-27
Payer: COMMERCIAL

## 2018-10-02 ENCOUNTER — APPOINTMENT (OUTPATIENT)
Dept: PHYSICAL THERAPY | Age: 38
End: 2018-10-02
Payer: COMMERCIAL

## 2018-10-04 ENCOUNTER — HOSPITAL ENCOUNTER (OUTPATIENT)
Dept: PHYSICAL THERAPY | Age: 38
Discharge: HOME | End: 2018-10-04
Payer: COMMERCIAL

## 2018-10-04 PROCEDURE — 97110 THERAPEUTIC EXERCISES: CPT
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
## 2018-10-09 ENCOUNTER — HOSPITAL ENCOUNTER (OUTPATIENT)
Dept: PHYSICAL THERAPY | Age: 38
Discharge: HOME | End: 2018-10-09
Payer: COMMERCIAL

## 2018-10-09 PROCEDURE — 97110 THERAPEUTIC EXERCISES: CPT

## 2018-10-09 NOTE — PROGRESS NOTES
PT DAILY TREATMENT NOTE  Patient Name: Oz Oglesby 
Date:10/9/2018 : 1980 [x]  Patient  Verified Payor: Joelle Solders / Plan: Delonte Godinez PPO / Product Type: PPO / In time:6:00  Out time:6:55 Total Treatment Time (min): 55 Visit #: 79 DS 67 Treatment Area: Low back pain [M54.5] SUBJECTIVE Pain Level (0-10 scale): 2 Any medication changes, allergies to medications, adverse drug reactions, diagnosis change, or new procedure performed?: [x] No    [] Yes (see summary sheet for update) Subjective functional status/changes:   [] No changes reported Its stiff today. More driving. \" OBJECTIVE 55 min Therapeutic Exercise:  [x] See flow sheet :  
Rationale: increase ROM, increase strength, improve coordination and improve balance to improve the patients ability to perform pain free ADL's With 
 [] TE 
 [] TA 
 [] neuro 
 [] other: Patient Education: [x] Review HEP [] Progressed/Changed HEP based on:  
[] positioning   [] body mechanics   [] transfers   [] heat/ice application   
[] other:   
 
Other Objective/Functional Measures: LTR  rihgt 16.5 Left 16.5 Pain Level (0-10 scale) post treatment: 0 
 
ASSESSMENT/Changes in Function: Pt reported increased soreness and aching in lateral lumbar spine after long car drive and standing. Continued to be challenged with Squats, lunges and planks . Demonstrated continued core weakness and lack of stability.   
Progressing well however continues to need glut and core strengthening when transitioning to gym program.  
 
Patient will continue to benefit from skilled PT services to modify and progress therapeutic interventions, address functional mobility deficits, address ROM deficits, address strength deficits, analyze and address soft tissue restrictions, analyze and cue movement patterns, analyze and modify body mechanics/ergonomics, assess and modify postural abnormalities, address imbalance/dizziness and instruct in home and community integration to attain remaining goals. []  See Plan of Care 
[]  See progress note/recertification 
[]  See Discharge Summary Progress towards goals / Updated goals: 
Short Term Goals: STG- To be accomplished in 2 week(s): 1.  Pt will be independent with HEP to encourage prophylaxis. Eval:dispensed Current: Pt reported compliance - goal MET 
   
Long Term Goals: LTG- To be accomplished in 6 week(s): 1.  Pt will increase trunk rotation to less than 15 in bilaterally to improve mobility . Eval:RIght 17.5 in                 Left: 18 in Last PN: 17 in - progressing Current: 15.5in - progressing 
   
2.  Pt will be able to bridge >10 times to full height with no pain to indicate functional glut hamstring strength needed for daily activities. Eval:pain with 1 bridge Last PN: tolerated 20 bridges with decreased height, no pain - PROGRESSING Current: no pain with 20 bridges, good height - goal MET 
   
3.  Pt will be able to bend and squat and lift 5 y.o. Child without pain. Eval:pain and fear Last PN: improving, able to  son however achy still with max pain 2/10 - progressing Current: able to  son however slight ache with carrying for long time, increased right knee valgus - almost MET 
   
4.  Pt FOTO score will increase by 15 points to show improvement in function. Eval:53 Last PN: 61 - progressing Current:MET 83 
  
 
PLAN [x]  Upgrade activities as tolerated     [x]  Continue plan of care 
[]  Update interventions per flow sheet      
[]  Discharge due to:_ 
[]  Other:_ Renita Ganser, PTA 10/9/2018  6:00 PM 
 
Future Appointments Date Time Provider Abdelrahman Ding 10/16/2018 6:00 PM Juliet Ware, PT, DPT [facility]  
10/23/2018 6:00 PM Juliet Ware PT, DPT [facility]

## 2018-10-11 ENCOUNTER — APPOINTMENT (OUTPATIENT)
Dept: PHYSICAL THERAPY | Age: 38
End: 2018-10-11
Payer: COMMERCIAL

## 2018-10-16 ENCOUNTER — HOSPITAL ENCOUNTER (OUTPATIENT)
Dept: PHYSICAL THERAPY | Age: 38
Discharge: HOME | End: 2018-10-16
Payer: COMMERCIAL

## 2018-10-16 PROCEDURE — 97110 THERAPEUTIC EXERCISES: CPT
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
## 2018-10-18 ENCOUNTER — APPOINTMENT (OUTPATIENT)
Dept: PHYSICAL THERAPY | Age: 38
End: 2018-10-18
Payer: COMMERCIAL

## 2018-10-23 ENCOUNTER — HOSPITAL ENCOUNTER (OUTPATIENT)
Dept: PHYSICAL THERAPY | Age: 38
Discharge: HOME | End: 2018-10-23
Payer: COMMERCIAL

## 2018-10-23 PROCEDURE — 97530 THERAPEUTIC ACTIVITIES: CPT

## 2018-10-23 PROCEDURE — 97110 THERAPEUTIC EXERCISES: CPT

## 2018-10-23 NOTE — PROGRESS NOTES
PT DAILY TREATMENT NOTE  Patient Name: Richelle Terrazas 
Date:10/23/2018 : 1980 [x]  Patient  Verified Payor: Houston Grant / Plan: [employer] PPO / Product Type: PPO / In time:6:00 pm   Out time:6:55 pm  
Total Treatment Time (min): 55 Visit #: 15 of 16 Treatment Area: Low back pain [M54.5] SUBJECTIVE Pain Level (0-10 scale): 0 Any medication changes, allergies to medications, adverse drug reactions, diagnosis change, or new procedure performed?: [x] No    [] Yes (see summary sheet for update) Subjective functional status/changes:   [] No changes reported \"When should the stiffness go away? \" OBJECTIVE Modality rationale:   
Min Type Additional Details  
 [] Estim:  []Unatt       []IFC  []Premod []Other:  []w/ice   []w/heat Position: Location:  
 [] Estim: []Att    []TENS instruct  []NMES []Other:  []w/US   []w/ice   []w/heat Position: Location:  
 []  Traction: [] Cervical       []Lumbar 
                     [] Prone          []Supine []Intermittent   []Continuous Lbs: 
[] before manual 
[] after manual  
 []  Ultrasound: []Continuous   [] Pulsed []1MHz   []3MHz W/cm2: 
Location:  
 []  Iontophoresis with dexamethasone Location: [] Take home patch  
[] In clinic  
 []  Ice     []  heat 
[]  Ice massage 
[]  Laser  
[]  Anodyne Position: Location:  
 []  Laser with stim 
[]  Other:  Position: Location:  
 []  Vasopneumatic Device Pressure:       [] lo [] med [] hi  
Temperature: [] lo [] med [] hi  
[] Skin assessment post-treatment:  []intact []redness- no adverse reaction 
  []redness  adverse reaction:  
 
 
45 min Therapeutic Exercise:  [x] See flow sheet :  
Rationale: increase ROM, increase strength, improve coordination and increase proprioception to improve the patients ability to perform daily activities with decreased pain and symptom levels 10 min Therapeutic Activity:  [x]  See flow sheet :  
Rationale: increase ROM, increase strength, improve coordination and increase proprioception  to improve the patients ability to perform daily activities with decreased pain and symptom levels With 
 [] TE 
 [] TA 
 [] neuro 
 [] other: Patient Education: [x] Review HEP [] Progressed/Changed HEP based on:  
[] positioning   [] body mechanics   [] transfers   [] heat/ice application   
[] other:   
 
Other Objective/Functional Measures:  
improved form with cues to IR ribs and actively engage obliques Pain Level (0-10 scale) post treatment: 0 
 
ASSESSMENT/Changes in Function: At present most limited with driving. Reports stiffness after day of sitting, after exercise and with driving. Reviewed modifications for driving. Patient will continue to benefit from skilled PT services to modify and progress therapeutic interventions, address functional mobility deficits, address ROM deficits, address strength deficits, analyze and address soft tissue restrictions, analyze and cue movement patterns, analyze and modify body mechanics/ergonomics, assess and modify postural abnormalities and instruct in home and community integration to attain remaining goals. []  See Plan of Care 
[]  See progress note/recertification 
[]  See Discharge Summary Progress towards goals / Updated goals: 
Short Term Goals: STG- To be accomplished in 2 week(s): 1.  Pt will be independent with HEP to encourage prophylaxis. Eval:dispensed Current: Pt reported compliance - goal MET 
   
Long Term Goals: LTG- To be accomplished in 6 week(s): 1.  Pt will increase trunk rotation to less than 15 in bilaterally to improve mobility . Eval:RIght 17.5 in                 Left: 18 in Last PN: 17 in - progressing Current: 15.5in - progressing 
   
2.  Pt will be able to bridge >10 times to full height with no pain to indicate functional glut hamstring strength needed for daily activities. Eval:pain with 1 bridge Last PN: tolerated 20 bridges with decreased height, no pain - PROGRESSING Current: no pain with 20 bridges, good height - goal MET Updated with SL bridges  
   
3.  Pt will be able to bend and squat and lift 5 y.o. Child without pain. Eval:pain and fear Last PN: improving, able to  son however achy still with max pain 2/10 - progressing Current: able to  son however slight ache with carrying for long time, increased right knee valgus - almost MET 
   
4.  Pt FOTO score will increase by 15 points to show improvement in function. Eval:53 Last PN: 61 - progressing Current:MET 83 PLAN 
[]  Upgrade activities as tolerated     []  Continue plan of care 
[]  Update interventions per flow sheet      
[]  Discharge due to:_ 
[]  Other:_ Wesley Alvarez, PT, DPT 10/23/2018  6:55 PM 
 
Future Appointments Date Time Provider Abdelrahman Ding 11/15/2018  3:30 PM Lalo Farah MD [place]

## 2018-10-25 ENCOUNTER — APPOINTMENT (OUTPATIENT)
Dept: PHYSICAL THERAPY | Age: 38
End: 2018-10-25
Payer: COMMERCIAL
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
## 2018-10-30 ENCOUNTER — APPOINTMENT (OUTPATIENT)
Dept: PHYSICAL THERAPY | Age: 38
End: 2018-10-30
Payer: COMMERCIAL
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
## 2021-03-16 ENCOUNTER — HOSPITAL ENCOUNTER (OUTPATIENT)
Dept: LAB | Age: 41
Discharge: HOME | End: 2021-03-16
Payer: COMMERCIAL

## 2021-03-16 ENCOUNTER — OFFICE VISIT (OUTPATIENT)
Dept: HEMATOLOGY | Age: 41
End: 2021-03-16
Payer: COMMERCIAL

## 2021-03-16 VITALS
BODY MASS INDEX: 26.86 KG/M2 | DIASTOLIC BLOOD PRESSURE: 86 MMHG | SYSTOLIC BLOOD PRESSURE: 131 MMHG | OXYGEN SATURATION: 98 % | HEIGHT: 67 IN | TEMPERATURE: 98.1 F | HEART RATE: 96 BPM | WEIGHT: 171.13 LBS

## 2021-03-16 DIAGNOSIS — R74.8 ELEVATED LIVER ENZYMES: Primary | ICD-10-CM

## 2021-03-16 DIAGNOSIS — R74.8 ELEVATED LIVER ENZYMES: ICD-10-CM

## 2021-03-16 PROBLEM — K76.0 FATTY LIVER: Status: ACTIVE | Noted: 2021-03-16

## 2021-03-16 LAB
ALBUMIN SERPL-MCNC: 4.4 G/DL (ref 3.4–5)
ALBUMIN/GLOB SERPL: 1.2 {RATIO} (ref 0.8–1.7)
ALP SERPL-CCNC: 113 U/L (ref 45–117)
ALT SERPL-CCNC: 68 U/L (ref 16–61)
ANION GAP SERPL CALC-SCNC: 6 MMOL/L (ref 3–18)
AST SERPL-CCNC: 38 U/L (ref 10–38)
BASOPHILS # BLD: 0 K/UL (ref 0–0.1)
BASOPHILS NFR BLD: 0 % (ref 0–2)
BILIRUB DIRECT SERPL-MCNC: 0.1 MG/DL (ref 0–0.2)
BILIRUB SERPL-MCNC: 0.4 MG/DL (ref 0.2–1)
BUN SERPL-MCNC: 18 MG/DL (ref 7–18)
BUN/CREAT SERPL: 18 (ref 12–20)
CALCIUM SERPL-MCNC: 9.3 MG/DL (ref 8.5–10.1)
CHLORIDE SERPL-SCNC: 104 MMOL/L (ref 100–111)
CO2 SERPL-SCNC: 30 MMOL/L (ref 21–32)
CREAT SERPL-MCNC: 0.98 MG/DL (ref 0.6–1.3)
DIFFERENTIAL METHOD BLD: ABNORMAL
EOSINOPHIL # BLD: 0.2 K/UL (ref 0–0.4)
EOSINOPHIL NFR BLD: 4 % (ref 0–5)
ERYTHROCYTE [DISTWIDTH] IN BLOOD BY AUTOMATED COUNT: 13.8 % (ref 11.6–14.5)
FERRITIN SERPL-MCNC: 72 NG/ML (ref 8–388)
GLOBULIN SER CALC-MCNC: 3.6 G/DL (ref 2–4)
GLUCOSE SERPL-MCNC: 100 MG/DL (ref 74–99)
HCT VFR BLD AUTO: 48.5 % (ref 36–48)
HGB BLD-MCNC: 15.3 G/DL (ref 13–16)
INR PPP: 1.1 (ref 0.8–1.2)
IRON SATN MFR SERPL: 14 % (ref 20–50)
IRON SERPL-MCNC: 50 UG/DL (ref 50–175)
LYMPHOCYTES # BLD: 2.1 K/UL (ref 0.9–3.6)
LYMPHOCYTES NFR BLD: 39 % (ref 21–52)
MCH RBC QN AUTO: 25.9 PG (ref 24–34)
MCHC RBC AUTO-ENTMCNC: 31.5 G/DL (ref 31–37)
MCV RBC AUTO: 82.2 FL (ref 74–97)
MONOCYTES # BLD: 0.4 K/UL (ref 0.05–1.2)
MONOCYTES NFR BLD: 7 % (ref 3–10)
NEUTS SEG # BLD: 2.8 K/UL (ref 1.8–8)
NEUTS SEG NFR BLD: 50 % (ref 40–73)
PLATELET # BLD AUTO: 240 K/UL (ref 135–420)
PMV BLD AUTO: 9.4 FL (ref 9.2–11.8)
POTASSIUM SERPL-SCNC: 4 MMOL/L (ref 3.5–5.5)
PROT SERPL-MCNC: 8 G/DL (ref 6.4–8.2)
PROTHROMBIN TIME: 14.1 SEC (ref 11.5–15.2)
RBC # BLD AUTO: 5.9 M/UL (ref 4.7–5.5)
SODIUM SERPL-SCNC: 140 MMOL/L (ref 136–145)
TIBC SERPL-MCNC: 366 UG/DL (ref 250–450)
WBC # BLD AUTO: 5.4 K/UL (ref 4.6–13.2)

## 2021-03-16 PROCEDURE — 85610 PROTHROMBIN TIME: CPT

## 2021-03-16 PROCEDURE — 83540 ASSAY OF IRON: CPT

## 2021-03-16 PROCEDURE — 82103 ALPHA-1-ANTITRYPSIN TOTAL: CPT

## 2021-03-16 PROCEDURE — 36415 COLL VENOUS BLD VENIPUNCTURE: CPT

## 2021-03-16 PROCEDURE — 86704 HEP B CORE ANTIBODY TOTAL: CPT

## 2021-03-16 PROCEDURE — 86038 ANTINUCLEAR ANTIBODIES: CPT

## 2021-03-16 PROCEDURE — 82390 ASSAY OF CERULOPLASMIN: CPT

## 2021-03-16 PROCEDURE — 83516 IMMUNOASSAY NONANTIBODY: CPT

## 2021-03-16 PROCEDURE — 80076 HEPATIC FUNCTION PANEL: CPT

## 2021-03-16 PROCEDURE — 86708 HEPATITIS A ANTIBODY: CPT

## 2021-03-16 PROCEDURE — 99203 OFFICE O/P NEW LOW 30 MIN: CPT | Performed by: INTERNAL MEDICINE

## 2021-03-16 PROCEDURE — 82728 ASSAY OF FERRITIN: CPT

## 2021-03-16 PROCEDURE — 86706 HEP B SURFACE ANTIBODY: CPT

## 2021-03-16 PROCEDURE — 85025 COMPLETE CBC W/AUTO DIFF WBC: CPT

## 2021-03-16 PROCEDURE — 80048 BASIC METABOLIC PNL TOTAL CA: CPT
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
## 2021-03-16 NOTE — Clinical Note
3/23/2021 Patient: Josefina Kumar YOB: 1980 Date of Visit: 3/16/2021 Swathi Aguilar MD 
[address] [employer] Lawson A [address] [place] [address] Via Fax: [phone] Dear Swathi Aguilar MD, Thank you for referring Mr. Josefina Kumar to [facility]. My notes for this consultation are attached. If you have questions, please do not hesitate to call me. I look forward to following your patient along with you. Sincerely, Corry Moreira MD

## 2021-03-17 LAB
A1AT SERPL-MCNC: 114 MG/DL (ref 95–164)
ACTIN IGG SERPL-ACNC: 4 UNITS (ref 0–19)
ANA TITR SER IF: NEGATIVE {TITER}
CERULOPLASMIN SERPL-MCNC: 22.5 MG/DL (ref 16–31)
HAV AB SER QL IA: NEGATIVE
HBV CORE AB SERPL QL IA: NEGATIVE
HBV SURFACE AB SER QL IA: POSITIVE
HBV SURFACE AB SERPL IA-ACNC: 166.51 MIU/ML
HEP BS AB COMMENT,HBSAC: NORMAL

## 2021-05-04 ENCOUNTER — HOSPITAL ENCOUNTER (OUTPATIENT)
Dept: LAB | Age: 41
Discharge: HOME | End: 2021-05-04
Payer: COMMERCIAL

## 2021-05-04 ENCOUNTER — OFFICE VISIT (OUTPATIENT)
Dept: HEMATOLOGY | Age: 41
End: 2021-05-04
Payer: COMMERCIAL

## 2021-05-04 VITALS
BODY MASS INDEX: 25.11 KG/M2 | WEIGHT: 160 LBS | HEIGHT: 67 IN | DIASTOLIC BLOOD PRESSURE: 84 MMHG | OXYGEN SATURATION: 97 % | SYSTOLIC BLOOD PRESSURE: 126 MMHG | RESPIRATION RATE: 15 BRPM | TEMPERATURE: 97.7 F | HEART RATE: 90 BPM

## 2021-05-04 DIAGNOSIS — R74.8 ELEVATED LIVER ENZYMES: ICD-10-CM

## 2021-05-04 DIAGNOSIS — R74.8 ELEVATED LIVER ENZYMES: Primary | ICD-10-CM

## 2021-05-04 LAB
ALBUMIN SERPL-MCNC: 4.1 G/DL (ref 3.4–5)
ALBUMIN/GLOB SERPL: 1.1 {RATIO} (ref 0.8–1.7)
ALP SERPL-CCNC: 120 U/L (ref 45–117)
ALT SERPL-CCNC: 85 U/L (ref 16–61)
AST SERPL-CCNC: 33 U/L (ref 10–38)
BILIRUB DIRECT SERPL-MCNC: 0.2 MG/DL (ref 0–0.2)
BILIRUB SERPL-MCNC: 0.5 MG/DL (ref 0.2–1)
GLOBULIN SER CALC-MCNC: 3.7 G/DL (ref 2–4)
PROT SERPL-MCNC: 7.8 G/DL (ref 6.4–8.2)

## 2021-05-04 PROCEDURE — 36415 COLL VENOUS BLD VENIPUNCTURE: CPT

## 2021-05-04 PROCEDURE — 80076 HEPATIC FUNCTION PANEL: CPT

## 2021-05-04 PROCEDURE — 91200 LIVER ELASTOGRAPHY: CPT | Performed by: NURSE PRACTITIONER

## 2021-05-04 PROCEDURE — 99214 OFFICE O/P EST MOD 30 MIN: CPT | Performed by: NURSE PRACTITIONER
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
## 2022-02-03 ENCOUNTER — HOSPITAL ENCOUNTER (OUTPATIENT)
Dept: GENERAL RADIOLOGY | Age: 42
Discharge: HOME | End: 2022-02-03
Payer: COMMERCIAL

## 2022-02-03 ENCOUNTER — TRANSCRIBE ORDER (OUTPATIENT)
Dept: REGISTRATION | Age: 42
End: 2022-02-03

## 2022-02-03 DIAGNOSIS — R09.89 ABNORMAL CHEST SOUNDS: ICD-10-CM

## 2022-02-03 DIAGNOSIS — R09.89 ABNORMAL CHEST SOUNDS: Primary | ICD-10-CM

## 2022-02-03 PROCEDURE — 71045 X-RAY EXAM CHEST 1 VIEW: CPT

## 2022-03-18 PROBLEM — R74.8 ELEVATED LIVER ENZYMES: Status: ACTIVE | Noted: 2021-03-16

## 2022-03-19 PROBLEM — R97.20 ELEVATED PSA: Status: ACTIVE | Noted: 2018-09-18

## 2022-03-19 PROBLEM — R31.29 MICROSCOPIC HEMATURIA: Status: ACTIVE | Noted: 2018-09-18

## 2022-03-20 PROBLEM — K76.0 FATTY LIVER: Status: ACTIVE | Noted: 2021-03-16

## 2022-05-04 ENCOUNTER — OFFICE VISIT (OUTPATIENT)
Dept: HEMATOLOGY | Age: 42
End: 2022-05-04
Payer: COMMERCIAL

## 2022-05-04 ENCOUNTER — HOSPITAL ENCOUNTER (OUTPATIENT)
Dept: LAB | Age: 42
Discharge: HOME | End: 2022-05-04
Payer: COMMERCIAL

## 2022-05-04 VITALS
HEIGHT: 67 IN | OXYGEN SATURATION: 98 % | RESPIRATION RATE: 16 BRPM | TEMPERATURE: 97.8 F | DIASTOLIC BLOOD PRESSURE: 86 MMHG | HEART RATE: 88 BPM | SYSTOLIC BLOOD PRESSURE: 122 MMHG | BODY MASS INDEX: 25.27 KG/M2 | WEIGHT: 161 LBS

## 2022-05-04 DIAGNOSIS — R74.8 ELEVATED LIVER ENZYMES: ICD-10-CM

## 2022-05-04 DIAGNOSIS — R74.8 ELEVATED LIVER ENZYMES: Primary | ICD-10-CM

## 2022-05-04 LAB
ALBUMIN SERPL-MCNC: 4 G/DL (ref 3.4–5)
ALBUMIN/GLOB SERPL: 1.1 {RATIO} (ref 0.8–1.7)
ALP SERPL-CCNC: 116 U/L (ref 45–117)
ALT SERPL-CCNC: 64 U/L (ref 16–61)
ANION GAP SERPL CALC-SCNC: 4 MMOL/L (ref 3–18)
AST SERPL-CCNC: 29 U/L (ref 10–38)
BASOPHILS # BLD: 0 K/UL (ref 0–0.1)
BASOPHILS NFR BLD: 1 % (ref 0–2)
BILIRUB DIRECT SERPL-MCNC: 0.2 MG/DL (ref 0–0.2)
BILIRUB SERPL-MCNC: 0.7 MG/DL (ref 0.2–1)
BUN SERPL-MCNC: 23 MG/DL (ref 7–18)
BUN/CREAT SERPL: 22 (ref 12–20)
CALCIUM SERPL-MCNC: 9.2 MG/DL (ref 8.5–10.1)
CHLORIDE SERPL-SCNC: 108 MMOL/L (ref 100–111)
CO2 SERPL-SCNC: 27 MMOL/L (ref 21–32)
CREAT SERPL-MCNC: 1.05 MG/DL (ref 0.6–1.3)
DIFFERENTIAL METHOD BLD: ABNORMAL
EOSINOPHIL # BLD: 0.2 K/UL (ref 0–0.4)
EOSINOPHIL NFR BLD: 4 % (ref 0–5)
ERYTHROCYTE [DISTWIDTH] IN BLOOD BY AUTOMATED COUNT: 14 % (ref 11.6–14.5)
GLOBULIN SER CALC-MCNC: 3.7 G/DL (ref 2–4)
GLUCOSE SERPL-MCNC: 93 MG/DL (ref 74–99)
HCT VFR BLD AUTO: 48.5 % (ref 36–48)
HGB BLD-MCNC: 15.6 G/DL (ref 13–16)
IMM GRANULOCYTES # BLD AUTO: 0 K/UL (ref 0–0.04)
IMM GRANULOCYTES NFR BLD AUTO: 1 % (ref 0–0.5)
LYMPHOCYTES # BLD: 1.6 K/UL (ref 0.9–3.6)
LYMPHOCYTES NFR BLD: 28 % (ref 21–52)
MCH RBC QN AUTO: 25.8 PG (ref 24–34)
MCHC RBC AUTO-ENTMCNC: 32.2 G/DL (ref 31–37)
MCV RBC AUTO: 80.3 FL (ref 78–100)
MONOCYTES # BLD: 0.4 K/UL (ref 0.05–1.2)
MONOCYTES NFR BLD: 7 % (ref 3–10)
NEUTS SEG # BLD: 3.3 K/UL (ref 1.8–8)
NEUTS SEG NFR BLD: 60 % (ref 40–73)
NRBC # BLD: 0 K/UL (ref 0–0.01)
NRBC BLD-RTO: 0 PER 100 WBC
PLATELET # BLD AUTO: 283 K/UL (ref 135–420)
PMV BLD AUTO: 9.7 FL (ref 9.2–11.8)
POTASSIUM SERPL-SCNC: 4 MMOL/L (ref 3.5–5.5)
PROT SERPL-MCNC: 7.7 G/DL (ref 6.4–8.2)
RBC # BLD AUTO: 6.04 M/UL (ref 4.35–5.65)
SODIUM SERPL-SCNC: 139 MMOL/L (ref 136–145)
WBC # BLD AUTO: 5.6 K/UL (ref 4.6–13.2)

## 2022-05-04 PROCEDURE — 85025 COMPLETE CBC W/AUTO DIFF WBC: CPT

## 2022-05-04 PROCEDURE — 80048 BASIC METABOLIC PNL TOTAL CA: CPT

## 2022-05-04 PROCEDURE — 91200 LIVER ELASTOGRAPHY: CPT | Performed by: NURSE PRACTITIONER

## 2022-05-04 PROCEDURE — 99214 OFFICE O/P EST MOD 30 MIN: CPT | Performed by: NURSE PRACTITIONER

## 2022-05-04 PROCEDURE — 80076 HEPATIC FUNCTION PANEL: CPT

## 2022-05-04 PROCEDURE — 36415 COLL VENOUS BLD VENIPUNCTURE: CPT
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
## 2024-01-29 ENCOUNTER — HOSPITAL ENCOUNTER (OUTPATIENT)
Facility: HOSPITAL | Age: 44
Discharge: HOME | End: 2024-02-01
Payer: COMMERCIAL

## 2024-01-29 VITALS — BODY MASS INDEX: 27.94 KG/M2 | WEIGHT: 178 LBS | HEIGHT: 67 IN

## 2024-01-29 DIAGNOSIS — Z01.818 PRE-OP TESTING: Primary | ICD-10-CM

## 2024-01-29 LAB
ANION GAP SERPL CALC-SCNC: 5 MMOL/L (ref 3–18)
BUN SERPL-MCNC: 19 MG/DL (ref 7–18)
BUN/CREAT SERPL: 18 (ref 12–20)
CALCIUM SERPL-MCNC: 9.2 MG/DL (ref 8.5–10.1)
CHLORIDE SERPL-SCNC: 106 MMOL/L (ref 100–111)
CO2 SERPL-SCNC: 30 MMOL/L (ref 21–32)
CREAT SERPL-MCNC: 1.06 MG/DL (ref 0.6–1.3)
ERYTHROCYTE [DISTWIDTH] IN BLOOD BY AUTOMATED COUNT: 13.9 % (ref 11.6–14.5)
GLUCOSE SERPL-MCNC: 108 MG/DL (ref 74–99)
HCT VFR BLD AUTO: 49.3 % (ref 36–48)
HGB BLD-MCNC: 16.3 G/DL (ref 13–16)
MCH RBC QN AUTO: 26.4 PG (ref 24–34)
MCHC RBC AUTO-ENTMCNC: 33.1 G/DL (ref 31–37)
MCV RBC AUTO: 79.9 FL (ref 78–100)
NRBC # BLD: 0 K/UL (ref 0–0.01)
NRBC BLD-RTO: 0 PER 100 WBC
PLATELET # BLD AUTO: 224 K/UL (ref 135–420)
PMV BLD AUTO: 9.4 FL (ref 9.2–11.8)
POTASSIUM SERPL-SCNC: 3.7 MMOL/L (ref 3.5–5.5)
RBC # BLD AUTO: 6.17 M/UL (ref 4.35–5.65)
SODIUM SERPL-SCNC: 141 MMOL/L (ref 136–145)
WBC # BLD AUTO: 6.9 K/UL (ref 4.6–13.2)

## 2024-01-29 PROCEDURE — 85027 COMPLETE CBC AUTOMATED: CPT

## 2024-01-29 PROCEDURE — 87086 URINE CULTURE/COLONY COUNT: CPT

## 2024-01-29 PROCEDURE — 80048 BASIC METABOLIC PNL TOTAL CA: CPT

## 2024-01-30 LAB
BACTERIA SPEC CULT: NORMAL
SERVICE CMNT-IMP: NORMAL

## 2024-02-07 ENCOUNTER — HOSPITAL ENCOUNTER (OUTPATIENT)
Facility: HOSPITAL | Age: 44
Setting detail: OUTPATIENT SURGERY
Discharge: HOME | End: 2024-02-07
Attending: UROLOGY | Admitting: UROLOGY
Payer: COMMERCIAL

## 2024-02-07 ENCOUNTER — ANESTHESIA (OUTPATIENT)
Facility: HOSPITAL | Age: 44
End: 2024-02-07
Payer: COMMERCIAL

## 2024-02-07 ENCOUNTER — ANESTHESIA EVENT (OUTPATIENT)
Facility: HOSPITAL | Age: 44
End: 2024-02-07
Payer: COMMERCIAL

## 2024-02-07 VITALS
HEIGHT: 67 IN | WEIGHT: 175.5 LBS | RESPIRATION RATE: 16 BRPM | HEART RATE: 82 BPM | DIASTOLIC BLOOD PRESSURE: 69 MMHG | OXYGEN SATURATION: 100 % | TEMPERATURE: 97.5 F | BODY MASS INDEX: 27.55 KG/M2 | SYSTOLIC BLOOD PRESSURE: 113 MMHG

## 2024-02-07 DIAGNOSIS — N47.1 PHIMOSIS OF PENIS: Primary | ICD-10-CM

## 2024-02-07 PROCEDURE — 6360000002 HC RX W HCPCS

## 2024-02-07 PROCEDURE — 2580000003 HC RX 258: Performed by: UROLOGY

## 2024-02-07 PROCEDURE — 2709999900 HC NON-CHARGEABLE SUPPLY: Performed by: UROLOGY

## 2024-02-07 PROCEDURE — 6360000002 HC RX W HCPCS: Performed by: UROLOGY

## 2024-02-07 PROCEDURE — 6370000000 HC RX 637 (ALT 250 FOR IP): Performed by: UROLOGY

## 2024-02-07 PROCEDURE — 3600000012 HC SURGERY LEVEL 2 ADDTL 15MIN: Performed by: UROLOGY

## 2024-02-07 PROCEDURE — 7100000000 HC PACU RECOVERY - FIRST 15 MIN: Performed by: UROLOGY

## 2024-02-07 PROCEDURE — 3600000002 HC SURGERY LEVEL 2 BASE: Performed by: UROLOGY

## 2024-02-07 PROCEDURE — 3700000000 HC ANESTHESIA ATTENDED CARE: Performed by: UROLOGY

## 2024-02-07 PROCEDURE — 2500000003 HC RX 250 WO HCPCS: Performed by: UROLOGY

## 2024-02-07 PROCEDURE — 7100000010 HC PHASE II RECOVERY - FIRST 15 MIN: Performed by: UROLOGY

## 2024-02-07 PROCEDURE — 7100000001 HC PACU RECOVERY - ADDTL 15 MIN: Performed by: UROLOGY

## 2024-02-07 PROCEDURE — 7100000011 HC PHASE II RECOVERY - ADDTL 15 MIN: Performed by: UROLOGY

## 2024-02-07 PROCEDURE — 3700000001 HC ADD 15 MINUTES (ANESTHESIA): Performed by: UROLOGY

## 2024-02-07 RX ORDER — SODIUM CHLORIDE 0.9 % (FLUSH) 0.9 %
5-40 SYRINGE (ML) INJECTION EVERY 12 HOURS SCHEDULED
Status: DISCONTINUED | OUTPATIENT
Start: 2024-02-07 | End: 2024-02-07 | Stop reason: HOSPADM

## 2024-02-07 RX ORDER — DEXAMETHASONE SODIUM PHOSPHATE 4 MG/ML
INJECTION, SOLUTION INTRA-ARTICULAR; INTRALESIONAL; INTRAMUSCULAR; INTRAVENOUS; SOFT TISSUE PRN
Status: DISCONTINUED | OUTPATIENT
Start: 2024-02-07 | End: 2024-02-07 | Stop reason: SDUPTHER

## 2024-02-07 RX ORDER — SODIUM CHLORIDE 9 MG/ML
INJECTION, SOLUTION INTRAVENOUS PRN
Status: DISCONTINUED | OUTPATIENT
Start: 2024-02-07 | End: 2024-02-07 | Stop reason: HOSPADM

## 2024-02-07 RX ORDER — HYDROMORPHONE HYDROCHLORIDE 1 MG/ML
0.5 INJECTION, SOLUTION INTRAMUSCULAR; INTRAVENOUS; SUBCUTANEOUS EVERY 5 MIN PRN
Status: DISCONTINUED | OUTPATIENT
Start: 2024-02-07 | End: 2024-02-07 | Stop reason: HOSPADM

## 2024-02-07 RX ORDER — FENTANYL CITRATE 50 UG/ML
INJECTION, SOLUTION INTRAMUSCULAR; INTRAVENOUS PRN
Status: DISCONTINUED | OUTPATIENT
Start: 2024-02-07 | End: 2024-02-07 | Stop reason: SDUPTHER

## 2024-02-07 RX ORDER — MEPERIDINE HYDROCHLORIDE 50 MG/ML
12.5 INJECTION INTRAMUSCULAR; INTRAVENOUS; SUBCUTANEOUS ONCE
Status: DISCONTINUED | OUTPATIENT
Start: 2024-02-07 | End: 2024-02-07 | Stop reason: HOSPADM

## 2024-02-07 RX ORDER — LIDOCAINE HYDROCHLORIDE 20 MG/ML
INJECTION, SOLUTION INTRAVENOUS PRN
Status: DISCONTINUED | OUTPATIENT
Start: 2024-02-07 | End: 2024-02-07 | Stop reason: SDUPTHER

## 2024-02-07 RX ORDER — OXYCODONE HYDROCHLORIDE AND ACETAMINOPHEN 5; 325 MG/1; MG/1
1 TABLET ORAL EVERY 6 HOURS PRN
Qty: 20 TABLET | Refills: 0 | Status: SHIPPED | OUTPATIENT
Start: 2024-02-07 | End: 2024-02-21

## 2024-02-07 RX ORDER — OXYCODONE HYDROCHLORIDE 5 MG/1
5 TABLET ORAL
Status: DISCONTINUED | OUTPATIENT
Start: 2024-02-07 | End: 2024-02-07 | Stop reason: HOSPADM

## 2024-02-07 RX ORDER — LABETALOL HYDROCHLORIDE 5 MG/ML
10 INJECTION, SOLUTION INTRAVENOUS
Status: DISCONTINUED | OUTPATIENT
Start: 2024-02-07 | End: 2024-02-07 | Stop reason: HOSPADM

## 2024-02-07 RX ORDER — LEVOFLOXACIN 5 MG/ML
500 INJECTION, SOLUTION INTRAVENOUS
Status: COMPLETED | OUTPATIENT
Start: 2024-02-07 | End: 2024-02-07

## 2024-02-07 RX ORDER — DROPERIDOL 2.5 MG/ML
0.62 INJECTION, SOLUTION INTRAMUSCULAR; INTRAVENOUS
Status: DISCONTINUED | OUTPATIENT
Start: 2024-02-07 | End: 2024-02-07 | Stop reason: HOSPADM

## 2024-02-07 RX ORDER — ONDANSETRON 2 MG/ML
INJECTION INTRAMUSCULAR; INTRAVENOUS PRN
Status: DISCONTINUED | OUTPATIENT
Start: 2024-02-07 | End: 2024-02-07 | Stop reason: SDUPTHER

## 2024-02-07 RX ORDER — PROPOFOL 10 MG/ML
INJECTION, EMULSION INTRAVENOUS PRN
Status: DISCONTINUED | OUTPATIENT
Start: 2024-02-07 | End: 2024-02-07 | Stop reason: SDUPTHER

## 2024-02-07 RX ORDER — SODIUM CHLORIDE, SODIUM LACTATE, POTASSIUM CHLORIDE, CALCIUM CHLORIDE 600; 310; 30; 20 MG/100ML; MG/100ML; MG/100ML; MG/100ML
INJECTION, SOLUTION INTRAVENOUS CONTINUOUS
Status: DISCONTINUED | OUTPATIENT
Start: 2024-02-07 | End: 2024-02-07 | Stop reason: HOSPADM

## 2024-02-07 RX ORDER — ONDANSETRON 2 MG/ML
4 INJECTION INTRAMUSCULAR; INTRAVENOUS
Status: DISCONTINUED | OUTPATIENT
Start: 2024-02-07 | End: 2024-02-07 | Stop reason: HOSPADM

## 2024-02-07 RX ORDER — DOCUSATE SODIUM 100 MG/1
100 CAPSULE, LIQUID FILLED ORAL 2 TIMES DAILY
Qty: 20 CAPSULE | Refills: 0 | Status: SHIPPED | OUTPATIENT
Start: 2024-02-07 | End: 2024-02-17

## 2024-02-07 RX ORDER — SODIUM CHLORIDE 0.9 % (FLUSH) 0.9 %
5-40 SYRINGE (ML) INJECTION PRN
Status: DISCONTINUED | OUTPATIENT
Start: 2024-02-07 | End: 2024-02-07 | Stop reason: HOSPADM

## 2024-02-07 RX ORDER — GINSENG 100 MG
CAPSULE ORAL PRN
Status: DISCONTINUED | OUTPATIENT
Start: 2024-02-07 | End: 2024-02-07 | Stop reason: ALTCHOICE

## 2024-02-07 RX ORDER — MIDAZOLAM HYDROCHLORIDE 1 MG/ML
INJECTION INTRAMUSCULAR; INTRAVENOUS PRN
Status: DISCONTINUED | OUTPATIENT
Start: 2024-02-07 | End: 2024-02-07 | Stop reason: SDUPTHER

## 2024-02-07 RX ORDER — IPRATROPIUM BROMIDE AND ALBUTEROL SULFATE 2.5; .5 MG/3ML; MG/3ML
1 SOLUTION RESPIRATORY (INHALATION)
Status: DISCONTINUED | OUTPATIENT
Start: 2024-02-07 | End: 2024-02-07 | Stop reason: HOSPADM

## 2024-02-07 RX ORDER — FENTANYL CITRATE 50 UG/ML
25 INJECTION, SOLUTION INTRAMUSCULAR; INTRAVENOUS EVERY 5 MIN PRN
Status: DISCONTINUED | OUTPATIENT
Start: 2024-02-07 | End: 2024-02-07 | Stop reason: HOSPADM

## 2024-02-07 RX ORDER — DIPHENHYDRAMINE HYDROCHLORIDE 50 MG/ML
12.5 INJECTION INTRAMUSCULAR; INTRAVENOUS
Status: DISCONTINUED | OUTPATIENT
Start: 2024-02-07 | End: 2024-02-07 | Stop reason: HOSPADM

## 2024-02-07 RX ADMIN — DEXAMETHASONE SODIUM PHOSPHATE 4 MG: 4 INJECTION, SOLUTION INTRAMUSCULAR; INTRAVENOUS at 17:12

## 2024-02-07 RX ADMIN — HYDROMORPHONE HYDROCHLORIDE 0.5 MG: 1 INJECTION, SOLUTION INTRAMUSCULAR; INTRAVENOUS; SUBCUTANEOUS at 17:22

## 2024-02-07 RX ADMIN — FENTANYL CITRATE 25 MCG: 50 INJECTION, SOLUTION INTRAMUSCULAR; INTRAVENOUS at 17:15

## 2024-02-07 RX ADMIN — MIDAZOLAM 2 MG: 1 INJECTION INTRAMUSCULAR; INTRAVENOUS at 17:04

## 2024-02-07 RX ADMIN — FENTANYL CITRATE 25 MCG: 50 INJECTION, SOLUTION INTRAMUSCULAR; INTRAVENOUS at 17:14

## 2024-02-07 RX ADMIN — SODIUM CHLORIDE, SODIUM LACTATE, POTASSIUM CHLORIDE, AND CALCIUM CHLORIDE: 600; 310; 30; 20 INJECTION, SOLUTION INTRAVENOUS at 14:22

## 2024-02-07 RX ADMIN — FENTANYL CITRATE 50 MCG: 50 INJECTION, SOLUTION INTRAMUSCULAR; INTRAVENOUS at 17:05

## 2024-02-07 RX ADMIN — HYDROMORPHONE HYDROCHLORIDE 0.5 MG: 1 INJECTION, SOLUTION INTRAMUSCULAR; INTRAVENOUS; SUBCUTANEOUS at 17:40

## 2024-02-07 RX ADMIN — LEVOFLOXACIN 500 MG: 5 INJECTION, SOLUTION INTRAVENOUS at 17:10

## 2024-02-07 RX ADMIN — ONDANSETRON HYDROCHLORIDE 4 MG: 2 INJECTION INTRAMUSCULAR; INTRAVENOUS at 17:24

## 2024-02-07 RX ADMIN — PROPOFOL 200 MG: 10 INJECTION, EMULSION INTRAVENOUS at 17:07

## 2024-02-07 RX ADMIN — LIDOCAINE HYDROCHLORIDE 100 MG: 20 INJECTION, SOLUTION INTRAVENOUS at 17:07

## 2024-02-07 ASSESSMENT — PAIN - FUNCTIONAL ASSESSMENT: PAIN_FUNCTIONAL_ASSESSMENT: 0-10

## 2024-02-07 ASSESSMENT — PAIN SCALES - GENERAL
PAINLEVEL_OUTOF10: 0
PAINLEVEL_OUTOF10: 0

## 2024-02-07 ASSESSMENT — LIFESTYLE VARIABLES: SMOKING_STATUS: 0

## 2024-02-07 NOTE — DISCHARGE INSTRUCTIONS
have strips of tape on the incision, leave the tape on until it falls off. Or follow your doctor's instructions for removing the tape. Keep the area dry at all times.  You will have a dressing over the incision. A dressing helps the incision heal and protects it. Your doctor will tell you how to take care of this.  If you do not have tape on the incision, wash the area daily with warm, soapy water, and pat it dry. Don't use hydrogen peroxide or alcohol, which can slow healing.    When should you call for help?    Call your doctor now or seek immediate medical care if:    You are dizzy or lightheaded, or you feel like you may faint.     You have bleeding that starts again or gets worse, such as soaking one or more bandages over 2 to 4 hours, even after holding pressure on the area.         __________________________________________________________________________________________________________________________________

## 2024-02-07 NOTE — PERIOP NOTE
TRANSFER - IN REPORT:    Verbal report received from GABRIELA Bettencourt on Estelle De Santiago  being received from phaseII for routine post-op      Report consisted of patient's Situation, Background, Assessment and   Recommendations(SBAR).     Information from the following report(s) ED SBAR, Adult Overview, Surgery Report, and Intake/Output was reviewed with the receiving nurse.    Opportunity for questions and clarification was provided.      Assessment completed upon patient's arrival to unit and care assumed.

## 2024-02-07 NOTE — ANESTHESIA POSTPROCEDURE EVALUATION
Department of Anesthesiology  Postprocedure Note    Patient: Estelle De Santiago  MRN: [id number]  YOB: 1980  Date of evaluation: 2/7/2024    Procedure Summary       Date: 02/07/24 Room / Location: [facility] 04 / [facility] MAIN OR    Anesthesia Start: 1704 Anesthesia Stop: 1746    Procedure: CIRCUMCISION (Penis) Diagnosis:       Phimosis of penis      (Phimosis of penis [N47.1])    Surgeons: Brittany Kim MD Responsible Provider: Carter Bui MD    Anesthesia Type: General ASA Status: 2            Anesthesia Type: General    Cora Phase I: Cora Score: 4    Cora Phase II:      Anesthesia Post Evaluation    Patient location during evaluation: PACU  Patient participation: complete - patient cannot participate  Level of consciousness: awake  Pain score: 0  Airway patency: patent  Nausea & Vomiting: no nausea and no vomiting  Cardiovascular status: blood pressure returned to baseline  Respiratory status: acceptable and room air  Hydration status: euvolemic  Pain management: adequate    No notable events documented.

## 2024-02-07 NOTE — H&P
[facility]  [address]  [facility] [place] [address]  Tel:  [phone]  Fax: [phone]    Patient :  Estelle De Santiago  YOB: 1980  Birth Sex:  Male  Current Gender:  Male  Date:   2/7/2024  Visit Type:    Office Visit  Assessment/Plan  #  Detail Type  Description   1.  Assessment  Phimosis of penis (N47.1).    Patient Plan   elective circumcision.  Risk benefits discussed.  Recovery course discussed including 6 weeks avoidance of sexual activity.  Swelling associated with the incision site that may last up to 6-8 weeks.  Patient understands wishes to proceed.  All questions were answered today. PAT per hospital protocol.              Additional Visit Information    This 43 year old patient presents for erectile dysfunction uro and Penile concerns.    History of Present Illness  1.  erectile dysfunction uro   The patient is here today for an initial visit.  The patient denies depression. Additional information: 11/9/23-pain with erection. Uncirc. No actual difficulty with erection. Onset most of his adult life. Phimosis. Hygiene issues.    2.  Penile concerns   The patient is begin evaluated for Penile concerns.The problem is perceived as moderate. The uncircumcised patient is noted to have phimosis. The patient reports no history of diabetes. Associated symptoms include penile pain. Pertinent negatives include fever, urinary frequency, urinary hesitancy, urinary straining and urinary urgency.Additional information: 11/9/2023 - uncircumcised.  Pain with erection.  Wishes for circumcision.      Past Medical/Surgical History   (Detailed)  Patient reported no relevant past med/surg/psych history.       Problem List  Problem List reviewed.     Medications (active prior to today)  Medication Name  Sig Description  Start Date  Stop Date  Refilled  Rx Elsewhere  atorvastatin  take 1 tablet by oral route  every day  //      Y  ketoconazole  apply by topical route  every day to the

## 2024-02-07 NOTE — PERIOP NOTE
Reviewed PTA medication list with patient/caregiver and patient/caregiver denies any additional medications.     Patient admits to having a responsible adult care for them at home for at least 24 hours after surgery.    Patient encouraged to use gown warming system and informed that using said warming gown to regulate body temperature prior to a procedure has been shown to help reduce the risks of blood clots and infection.    Patient's pharmacy of choice verified and documented in PTA medication section.    Dual skin assessment & fall risk band verification completed with Fabrice STARKEY RN.

## 2024-02-07 NOTE — PERIOP NOTE
TRANSFER - OUT REPORT:    Verbal report given to Milvia OCHOA on Estelle De Santiago  being transferred to Phase II for routine progression of patient care       Report consisted of patient's Situation, Background, Assessment and   Recommendations(SBAR).     Information from the following report(s) Nurse Handoff Report, Surgery Report, Intake/Output, and MAR was reviewed with the receiving nurse.           Lines:   Peripheral IV 02/07/24 Left;Posterior Hand (Active)   Site Assessment Clean, dry & intact 02/07/24 1745   Line Status Flushed;Infusing 02/07/24 1745   Line Care Connections checked and tightened 02/07/24 1745   Phlebitis Assessment No symptoms 02/07/24 1745   Infiltration Assessment 0 02/07/24 1745   Alcohol Cap Used No 02/07/24 1745   Dressing Status Clean, dry & intact 02/07/24 1745   Dressing Type Transparent 02/07/24 1745        Opportunity for questions and clarification was provided.      Patient transported with:  Registered Nurse

## 2024-02-07 NOTE — PERIOP NOTE
TRANSFER - IN REPORT:    Verbal report received from Xochilt OCHOA and Diann BEST CRNA on Estelle De Santiago  being received from OR for routine post-op      Report consisted of patient's Situation, Background, Assessment and   Recommendations(SBAR).     Information from the following report(s) Nurse Handoff Report, Surgery Report, Intake/Output, and MAR was reviewed with the receiving nurse.    Opportunity for questions and clarification was provided.      Assessment completed upon patient's arrival to unit and care assumed.

## 2024-02-07 NOTE — ANESTHESIA PRE PROCEDURE
Department of Anesthesiology  Preprocedure Note       Name:  Estelle De Santiago   Age:  43 y.o.  :  1980                                          MRN:  [id number]         Date:  2024      Surgeon: Surgeon(s):  Brittany Kim MD    Procedure: Procedure(s):  CIRCUMCISION    Medications prior to admission:   Prior to Admission medications    Medication Sig Start Date End Date Taking? Authorizing Provider   atorvastatin (LIPITOR) 20 MG tablet Take 1 tablet by mouth nightly    Sirena Bray MD   Multiple Vitamins-Minerals (THERAPEUTIC MULTIVITAMIN-MINERALS) tablet Take 1 tablet by mouth daily    Sirena Bray MD   triamcinolone (KENALOG) 0.1 % cream Apply topically 2 times daily as needed Apply topically 2 times daily.    ProviderSirena MD       Current medications:    Current Facility-Administered Medications   Medication Dose Route Frequency Provider Last Rate Last Admin    lactated ringers IV soln infusion   IntraVENous Continuous Brittany Kim  mL/hr at 24 1422 New Bag at 24 1422    levoFLOXacin (LEVAQUIN) 500 MG/100ML infusion 500 mg  500 mg IntraVENous On Call to OR Brittany Kim MD           Allergies:    Allergies   Allergen Reactions    Penicillins Rash       Problem List:    Patient Active Problem List   Diagnosis Code    Elevated liver enzymes R74.8    Elevated PSA R97.20    Microscopic hematuria R31.29    Fatty liver K76.0       Past Medical History:        Diagnosis Date    Acid reflux     High cholesterol     Prolonged emergence from general anesthesia        Past Surgical History:        Procedure Laterality Date    SEPTOPLASTY         Social History:    Social History     Tobacco Use    Smoking status: Never    Smokeless tobacco: Never   Substance Use Topics    Alcohol use: Yes     Alcohol/week: 1.0 - 2.0 standard drink of alcohol     Types: 1 - 2 Drinks containing 0.5 oz of alcohol per week                                Counseling

## 2024-02-07 NOTE — OP NOTE
Operative Note      Patient: Estelle De Santiago  YOB: 1980  MRN: [id number]    Date of Procedure: 2/7/2024    Pre-Op Diagnosis Codes:     * Phimosis of penis [N47.1]    Post-Op Diagnosis: Post-Op Diagnosis Codes:     * Phimosis of penis [N47.1]       Procedure(s):  CIRCUMCISION    Surgeon(s):  Brittany Kim MD    Assistant:   Surgical Assistant: Estee Mc    Anesthesia: General    Estimated Blood Loss (mL): Minimal    Complications: None    Specimens:   * No specimens in log *    Implants:  * No implants in log *      Drains: * No LDAs found *    Findings: phimosis        Detailed Description of Procedure:   PROCEDURE NOTE:  Patient was wheeled into the operating theatre. Time-out was performed.  Patient was identified using 3 identifiers.  Anesthesia was then administered without incident.  Preoperative IV antibiotics were administered. Dorsal penile block was performed with 0.5% marcaine. The foreskin was then marked proximally (at level of coronal sulcus) and distally (at mucosal skirt) and a #15-blade was then used to make a circumferential incision.  A curved hemostat was used to dissect a passage deep to the skin to allow us to transect the skin from prox to distal with electrocautery. This creates a flap with 2 edges. Then 4 snaps are fastened, one to each edge. Then the skin was sharply dissected off of the underlying tissue using Bovie cautery. Attention was then turned to any bleeding with excellent hemostasis achieved using the Bovie.  Using 3-0 vicryl, a U-stitch at the frenulum was used to align the skin edges. The dorsal edge was approximated with 3-0 vicryl. The circumcision line was approximated with 3-0 running suture in a total of 2 quadrants.  This completed the procedure.  Bacitracin was used as a dressing.  The patient was then reversed from anesthesia and transferred to the PACU in stable condition.     Electronically signed by Brittany Kim MD on 2/7/2024 at

## 2024-02-07 NOTE — PERIOP NOTE
Patient transferred from stretcher to chair without assistance. Dressing taken down per instructions. Patient denies any pain complaints. Patient tolerating PO fluids.

## (undated) DEVICE — STRETCH BANDAGE: Brand: CURITY

## (undated) DEVICE — GOWN,AURORA,NONRNF,XL,30/CS: Brand: MEDLINE

## (undated) DEVICE — SOLUTION IRRIG 500ML 0.9% SOD CHLO USP POUR PLAS BTL

## (undated) DEVICE — ELECTRODE PT RET AD L9FT HI MOIST COND ADH HYDRGEL CORDED

## (undated) DEVICE — SUTURE VCRL + SZ 3-0 L27IN ABSRB UD L26MM SH 1/2 CIR VCP416H

## (undated) DEVICE — STRAP,POSITIONING,KNEE/BODY,FOAM,4X60": Brand: MEDLINE

## (undated) DEVICE — STERILE LATEX POWDER-FREE SURGICAL GLOVESWITH NITRILE COATING: Brand: PROTEXIS

## (undated) DEVICE — PREMIUM WET SKIN PREP TRAY: Brand: MEDLINE INDUSTRIES, INC.

## (undated) DEVICE — GARMENT,MEDLINE,DVT,INT,CALF,MED, GEN2: Brand: MEDLINE

## (undated) DEVICE — MINOR: Brand: MEDLINE INDUSTRIES, INC.